# Patient Record
Sex: MALE | Race: WHITE | Employment: FULL TIME | URBAN - METROPOLITAN AREA
[De-identification: names, ages, dates, MRNs, and addresses within clinical notes are randomized per-mention and may not be internally consistent; named-entity substitution may affect disease eponyms.]

---

## 2021-05-05 ENCOUNTER — OFFICE VISIT (OUTPATIENT)
Dept: ENDOCRINOLOGY CLINIC | Facility: CLINIC | Age: 61
End: 2021-05-05
Payer: COMMERCIAL

## 2021-05-05 VITALS
BODY MASS INDEX: 28.14 KG/M2 | HEART RATE: 101 BPM | WEIGHT: 201 LBS | HEIGHT: 71 IN | SYSTOLIC BLOOD PRESSURE: 108 MMHG | DIASTOLIC BLOOD PRESSURE: 69 MMHG

## 2021-05-05 DIAGNOSIS — D44.3 HYPERPROLACTINOMA (HCC): ICD-10-CM

## 2021-05-05 DIAGNOSIS — D35.2 PITUITARY MACROADENOMA WITH EXTRASELLAR EXTENSION (HCC): Primary | ICD-10-CM

## 2021-05-05 PROBLEM — I10 ESSENTIAL HYPERTENSION: Status: ACTIVE | Noted: 2020-10-27

## 2021-05-05 PROBLEM — J45.909 ASTHMA: Status: ACTIVE | Noted: 2020-10-27

## 2021-05-05 PROCEDURE — 3008F BODY MASS INDEX DOCD: CPT | Performed by: INTERNAL MEDICINE

## 2021-05-05 PROCEDURE — 3074F SYST BP LT 130 MM HG: CPT | Performed by: INTERNAL MEDICINE

## 2021-05-05 PROCEDURE — 3078F DIAST BP <80 MM HG: CPT | Performed by: INTERNAL MEDICINE

## 2021-05-05 PROCEDURE — 99204 OFFICE O/P NEW MOD 45 MIN: CPT | Performed by: INTERNAL MEDICINE

## 2021-05-05 RX ORDER — VALSARTAN 160 MG/1
160 TABLET ORAL DAILY
COMMUNITY

## 2021-05-05 RX ORDER — CABERGOLINE 0.5 MG/1
TABLET ORAL
COMMUNITY
Start: 2021-03-25 | End: 2021-10-19

## 2021-05-05 RX ORDER — METOPROLOL TARTRATE 50 MG/1
1 TABLET, FILM COATED ORAL 2 TIMES DAILY
COMMUNITY

## 2021-05-05 NOTE — H&P
New Patient Evaluation - History and Physical    CONSULT - Reason for Visit:  Pituitary Macroadenoma.   Requesting Physician: Self-referral.    CHIEF COMPLAINT:  Patient presents with:  Consult: for prolactinoma       HISTORY OF PRESENT ILLNESS:   Marc Lee quittin.3      Smokeless tobacco: Never Used    Vaping Use      Vaping Use: Never used    Alcohol use: Not Currently      Comment: rare    Drug use: Never      FAMILY HISTORY:   History reviewed. No pertinent family history.     CURRENT MEDICATIONS: bilaterally, no crackles or wheezing  CARDIOVASCULAR:  regular rate and rhythm, normal S1 and S2  ABDOMEN:  soft  SKIN:  no bruising or bleeding, no rashes and no lesions  EXTREMITIES: no edema      DATA:   Prolactin- 16.5    Imagin20:  MRI Brain:

## 2021-05-06 PROBLEM — D44.3 HYPERPROLACTINOMA (HCC): Status: ACTIVE | Noted: 2021-05-06

## 2021-05-06 PROBLEM — D35.2 PITUITARY MACROADENOMA WITH EXTRASELLAR EXTENSION (HCC): Status: ACTIVE | Noted: 2021-05-06

## 2021-06-15 ENCOUNTER — LAB ENCOUNTER (OUTPATIENT)
Dept: LAB | Facility: HOSPITAL | Age: 61
End: 2021-06-15
Attending: INTERNAL MEDICINE
Payer: COMMERCIAL

## 2021-06-15 DIAGNOSIS — D44.3 HYPERPROLACTINOMA (HCC): ICD-10-CM

## 2021-06-15 DIAGNOSIS — D35.2 PITUITARY MACROADENOMA WITH EXTRASELLAR EXTENSION (HCC): ICD-10-CM

## 2021-06-15 PROCEDURE — 84305 ASSAY OF SOMATOMEDIN: CPT

## 2021-06-15 PROCEDURE — 82533 TOTAL CORTISOL: CPT

## 2021-06-15 PROCEDURE — 84146 ASSAY OF PROLACTIN: CPT

## 2021-06-15 PROCEDURE — 82024 ASSAY OF ACTH: CPT

## 2021-06-15 PROCEDURE — 84443 ASSAY THYROID STIM HORMONE: CPT

## 2021-06-15 PROCEDURE — 83002 ASSAY OF GONADOTROPIN (LH): CPT

## 2021-06-15 PROCEDURE — 84403 ASSAY OF TOTAL TESTOSTERONE: CPT

## 2021-06-15 PROCEDURE — 84270 ASSAY OF SEX HORMONE GLOBUL: CPT

## 2021-06-15 PROCEDURE — 36415 COLL VENOUS BLD VENIPUNCTURE: CPT

## 2021-06-15 PROCEDURE — 83001 ASSAY OF GONADOTROPIN (FSH): CPT

## 2021-06-15 PROCEDURE — 84439 ASSAY OF FREE THYROXINE: CPT

## 2021-06-23 ENCOUNTER — OFFICE VISIT (OUTPATIENT)
Dept: ENDOCRINOLOGY CLINIC | Facility: CLINIC | Age: 61
End: 2021-06-23
Payer: COMMERCIAL

## 2021-06-23 ENCOUNTER — TELEPHONE (OUTPATIENT)
Dept: ENDOCRINOLOGY CLINIC | Facility: CLINIC | Age: 61
End: 2021-06-23

## 2021-06-23 VITALS
WEIGHT: 204 LBS | SYSTOLIC BLOOD PRESSURE: 144 MMHG | DIASTOLIC BLOOD PRESSURE: 81 MMHG | HEIGHT: 71 IN | HEART RATE: 96 BPM | BODY MASS INDEX: 28.56 KG/M2

## 2021-06-23 DIAGNOSIS — E27.40 LOW SERUM CORTISOL LEVEL (HCC): ICD-10-CM

## 2021-06-23 DIAGNOSIS — D35.2 MACROPROLACTINOMA (HCC): Primary | ICD-10-CM

## 2021-06-23 DIAGNOSIS — D35.2 PITUITARY MACROADENOMA (HCC): ICD-10-CM

## 2021-06-23 PROCEDURE — 3077F SYST BP >= 140 MM HG: CPT | Performed by: INTERNAL MEDICINE

## 2021-06-23 PROCEDURE — 3008F BODY MASS INDEX DOCD: CPT | Performed by: INTERNAL MEDICINE

## 2021-06-23 PROCEDURE — 99214 OFFICE O/P EST MOD 30 MIN: CPT | Performed by: INTERNAL MEDICINE

## 2021-06-23 PROCEDURE — 3079F DIAST BP 80-89 MM HG: CPT | Performed by: INTERNAL MEDICINE

## 2021-06-23 NOTE — PROGRESS NOTES
New Patient Evaluation - History and Physical    CONSULT - Reason for Visit:  Pituitary Macroadenoma. Requesting Physician: Self-referral.    CHIEF COMPLAINT:  Patient presents with:  Pituitary Problem: follow up.         HISTORY OF PRESENT ILLNESS:   [place] History:   Procedure Laterality Date   • EXPLOR MAXILL SINUS,RMV POLYPS     • TONSILLECTOMY         SOCIAL HISTORY:    Social History    Tobacco Use      Smoking status: Former Smoker        Quit date:         Years since quittin.4      Smokeless normal, restricted eye movements  SKIN:  no bruising or bleeding, no rashes and no lesions  EXTREMITIES: no edema      DATA:   06/15/21:  Prolactin- 16.3  LH- 1.1  FSH- 1.0  IGF-1- 182  Cortisol- 7.3  ACTH- 35.2  BioAvailable Testosterone- 97  TSH- 1.910

## 2021-06-24 PROBLEM — D35.2 PITUITARY MACROADENOMA (HCC): Status: ACTIVE | Noted: 2021-05-06

## 2021-06-24 PROBLEM — D35.2 MACROPROLACTINOMA (HCC): Status: ACTIVE | Noted: 2021-05-06

## 2021-06-29 ENCOUNTER — OFFICE VISIT (OUTPATIENT)
Dept: HEMATOLOGY/ONCOLOGY | Facility: HOSPITAL | Age: 61
End: 2021-06-29
Attending: INTERNAL MEDICINE
Payer: COMMERCIAL

## 2021-06-29 VITALS
OXYGEN SATURATION: 94 % | RESPIRATION RATE: 16 BRPM | TEMPERATURE: 97 F | SYSTOLIC BLOOD PRESSURE: 153 MMHG | HEART RATE: 92 BPM | DIASTOLIC BLOOD PRESSURE: 87 MMHG

## 2021-06-29 DIAGNOSIS — E27.40 LOW SERUM CORTISOL LEVEL (HCC): Primary | ICD-10-CM

## 2021-06-29 PROCEDURE — 99211 OFF/OP EST MAY X REQ PHY/QHP: CPT

## 2021-06-29 PROCEDURE — 36415 COLL VENOUS BLD VENIPUNCTURE: CPT

## 2021-06-29 PROCEDURE — 82533 TOTAL CORTISOL: CPT

## 2021-06-29 PROCEDURE — 96374 THER/PROPH/DIAG INJ IV PUSH: CPT

## 2021-06-29 PROCEDURE — 82024 ASSAY OF ACTH: CPT

## 2021-06-29 RX ORDER — COSYNTROPIN 0.25 MG/ML
INJECTION, POWDER, FOR SOLUTION INTRAMUSCULAR; INTRAVENOUS
Status: COMPLETED
Start: 2021-06-29 | End: 2021-06-29

## 2021-06-29 RX ORDER — COSYNTROPIN 0.25 MG/ML
0.25 INJECTION, POWDER, FOR SOLUTION INTRAMUSCULAR; INTRAVENOUS ONCE
Status: COMPLETED | OUTPATIENT
Start: 2021-06-29 | End: 2021-06-29

## 2021-06-29 RX ORDER — COSYNTROPIN 0.25 MG/ML
0.25 INJECTION, POWDER, FOR SOLUTION INTRAMUSCULAR; INTRAVENOUS ONCE
Status: CANCELLED | OUTPATIENT
Start: 2021-06-29 | End: 2021-06-29

## 2021-06-29 RX ORDER — SODIUM CHLORIDE 9 MG/ML
INJECTION INTRAVENOUS
Status: DISCONTINUED
Start: 2021-06-29 | End: 2021-06-29

## 2021-06-29 RX ADMIN — COSYNTROPIN 0.25 MG: 0.25 INJECTION, POWDER, FOR SOLUTION INTRAMUSCULAR; INTRAVENOUS at 07:58:00

## 2021-06-29 NOTE — PROGRESS NOTES
Pt here for ACTH stim test.  Appeared to tolerate w/o s/s of rxn, reports no side effects. Discharged home ambulating independently.

## 2021-07-04 ENCOUNTER — TELEPHONE (OUTPATIENT)
Dept: ENDOCRINOLOGY CLINIC | Facility: CLINIC | Age: 61
End: 2021-07-04

## 2021-07-04 NOTE — TELEPHONE ENCOUNTER
Hi!  Can we please call  this patient and let him know that he has had a good response to the ACTH stimulation test. However, I do feel that over time, this may deteriorate, as his AM cortisol remains low. We should evaluate this periodically.  For now, he

## 2021-07-20 ENCOUNTER — HOSPITAL ENCOUNTER (OUTPATIENT)
Dept: MRI IMAGING | Facility: HOSPITAL | Age: 61
Discharge: HOME | End: 2021-07-20
Attending: INTERNAL MEDICINE
Payer: COMMERCIAL

## 2021-07-20 DIAGNOSIS — D35.2 MACROPROLACTINOMA (HCC): ICD-10-CM

## 2021-07-20 DIAGNOSIS — D35.2 PITUITARY MACROADENOMA (HCC): ICD-10-CM

## 2021-07-20 PROCEDURE — 70553 MRI BRAIN STEM W/O & W/DYE: CPT | Performed by: INTERNAL MEDICINE

## 2021-07-20 PROCEDURE — A9575 INJ GADOTERATE MEGLUMI 0.1ML: HCPCS | Performed by: INTERNAL MEDICINE

## 2021-07-25 ENCOUNTER — TELEPHONE (OUTPATIENT)
Dept: ENDOCRINOLOGY CLINIC | Facility: CLINIC | Age: 61
End: 2021-07-25

## 2021-07-25 NOTE — TELEPHONE ENCOUNTER
Hi!  Can we please call this patient and find out if he has a neurosurgeon, or if he needs a referral. Please let him know that it is my advice that he should see the neurosurgeon at this time. Even though he is having good response to the cabergoline.  Any Grullon

## 2021-07-26 ENCOUNTER — TELEPHONE (OUTPATIENT)
Dept: ENDOCRINOLOGY CLINIC | Facility: CLINIC | Age: 61
End: 2021-07-26

## 2021-07-27 NOTE — TELEPHONE ENCOUNTER
Spoke to patient in regard to note below. Patient's wife verbalized understanding and will seek advice from neurosurgeon.

## 2021-07-27 NOTE — TELEPHONE ENCOUNTER
Left message to call back in regard to message. Below. Per Dr. Gaby Chatman have not changed since previous MRI. She would like patient to seek the advice of a neurosurgeon.

## 2021-10-18 ENCOUNTER — TELEPHONE (OUTPATIENT)
Dept: ENDOCRINOLOGY CLINIC | Facility: CLINIC | Age: 61
End: 2021-10-18

## 2021-10-18 DIAGNOSIS — D35.2 PITUITARY MACROADENOMA WITH EXTRASELLAR EXTENSION (HCC): ICD-10-CM

## 2021-10-18 DIAGNOSIS — D35.2 MACROPROLACTINOMA (HCC): Primary | ICD-10-CM

## 2021-10-18 DIAGNOSIS — D35.2 PITUITARY MACROADENOMA (HCC): ICD-10-CM

## 2021-10-18 DIAGNOSIS — D44.3 HYPERPROLACTINOMA (HCC): ICD-10-CM

## 2021-10-18 NOTE — TELEPHONE ENCOUNTER
Wife is requesting to get a New Rx for Cabergoline med 0.5mg - 1/2 tab 2 time a week. Please send new Rx to [facility].          Current Outpatient Medications   Medication Sig Dispense Refill   •       •       • Cabergoline 0.5 MG Oral Tab TAKE HALF TABLET

## 2021-10-20 RX ORDER — CABERGOLINE 0.5 MG/1
TABLET ORAL
Qty: 12 TABLET | Refills: 0 | Status: SHIPPED | OUTPATIENT
Start: 2021-10-20